# Patient Record
Sex: MALE | Race: ASIAN | NOT HISPANIC OR LATINO | URBAN - METROPOLITAN AREA
[De-identification: names, ages, dates, MRNs, and addresses within clinical notes are randomized per-mention and may not be internally consistent; named-entity substitution may affect disease eponyms.]

---

## 2017-12-25 ENCOUNTER — EMERGENCY (EMERGENCY)
Facility: HOSPITAL | Age: 52
LOS: 1 days | End: 2017-12-25
Attending: EMERGENCY MEDICINE | Admitting: EMERGENCY MEDICINE
Payer: COMMERCIAL

## 2017-12-25 VITALS — SYSTOLIC BLOOD PRESSURE: 152 MMHG | DIASTOLIC BLOOD PRESSURE: 74 MMHG

## 2017-12-25 DIAGNOSIS — I46.9 CARDIAC ARREST, CAUSE UNSPECIFIED: ICD-10-CM

## 2017-12-25 DIAGNOSIS — I10 ESSENTIAL (PRIMARY) HYPERTENSION: ICD-10-CM

## 2017-12-25 LAB
ALBUMIN SERPL ELPH-MCNC: 2.7 G/DL — LOW (ref 3.4–5)
ALBUMIN SERPL ELPH-MCNC: 3.5 G/DL — SIGNIFICANT CHANGE UP (ref 3.4–5)
ALP SERPL-CCNC: 104 U/L — SIGNIFICANT CHANGE UP (ref 40–120)
ALP SERPL-CCNC: 88 U/L — SIGNIFICANT CHANGE UP (ref 40–120)
ALT FLD-CCNC: 137 U/L — HIGH (ref 12–42)
ALT FLD-CCNC: 39 U/L — SIGNIFICANT CHANGE UP (ref 12–42)
ANION GAP SERPL CALC-SCNC: 14 MMOL/L — SIGNIFICANT CHANGE UP (ref 9–16)
ANION GAP SERPL CALC-SCNC: 18 MMOL/L — HIGH (ref 9–16)
APPEARANCE UR: CLEAR — SIGNIFICANT CHANGE UP
APTT BLD: SIGNIFICANT CHANGE UP SEC (ref 27.5–36.5)
AST SERPL-CCNC: 296 U/L — HIGH (ref 15–37)
AST SERPL-CCNC: 52 U/L — HIGH (ref 15–37)
BASOPHILS NFR BLD AUTO: 0.9 % — SIGNIFICANT CHANGE UP (ref 0–2)
BILIRUB SERPL-MCNC: 0.3 MG/DL — SIGNIFICANT CHANGE UP (ref 0.2–1.2)
BILIRUB SERPL-MCNC: 0.4 MG/DL — SIGNIFICANT CHANGE UP (ref 0.2–1.2)
BILIRUB UR-MCNC: NEGATIVE — SIGNIFICANT CHANGE UP
BUN SERPL-MCNC: 23 MG/DL — SIGNIFICANT CHANGE UP (ref 7–23)
BUN SERPL-MCNC: 25 MG/DL — HIGH (ref 7–23)
CALCIUM SERPL-MCNC: 7.5 MG/DL — LOW (ref 8.5–10.5)
CALCIUM SERPL-MCNC: 9 MG/DL — SIGNIFICANT CHANGE UP (ref 8.5–10.5)
CHLORIDE SERPL-SCNC: 101 MMOL/L — SIGNIFICANT CHANGE UP (ref 96–108)
CHLORIDE SERPL-SCNC: 108 MMOL/L — SIGNIFICANT CHANGE UP (ref 96–108)
CK MB BLD-MCNC: 11.04 % — HIGH
CK MB BLD-MCNC: 3.17 % — SIGNIFICANT CHANGE UP
CK MB CFR SERPL CALC: 156.1 NG/ML — HIGH (ref 0.5–3.6)
CK MB CFR SERPL CALC: 4.5 NG/ML — HIGH (ref 0.5–3.6)
CK SERPL-CCNC: 142 U/L — SIGNIFICANT CHANGE UP (ref 39–308)
CO2 SERPL-SCNC: 20 MMOL/L — LOW (ref 22–31)
CO2 SERPL-SCNC: 24 MMOL/L — SIGNIFICANT CHANGE UP (ref 22–31)
COLOR SPEC: YELLOW — SIGNIFICANT CHANGE UP
CREAT SERPL-MCNC: 1.51 MG/DL — HIGH (ref 0.5–1.3)
CREAT SERPL-MCNC: 1.69 MG/DL — HIGH (ref 0.5–1.3)
D DIMER BLD IA.RAPID-MCNC: 1355 NG/ML DDU — HIGH
D DIMER BLD IA.RAPID-MCNC: 2567 NG/ML DDU — HIGH
DIFF PNL FLD: (no result)
EOSINOPHIL NFR BLD AUTO: 1.7 % — SIGNIFICANT CHANGE UP (ref 0–6)
GLUCOSE SERPL-MCNC: 315 MG/DL — HIGH (ref 70–99)
GLUCOSE SERPL-MCNC: 390 MG/DL — HIGH (ref 70–99)
GLUCOSE UR QL: NEGATIVE — SIGNIFICANT CHANGE UP
HCT VFR BLD CALC: 45.9 % — SIGNIFICANT CHANGE UP (ref 39–50)
HCT VFR BLD CALC: 47.1 % — SIGNIFICANT CHANGE UP (ref 39–50)
HGB BLD-MCNC: 14.2 G/DL — SIGNIFICANT CHANGE UP (ref 13–17)
HGB BLD-MCNC: 15.1 G/DL — SIGNIFICANT CHANGE UP (ref 13–17)
IMM GRANULOCYTES NFR BLD AUTO: 9.9 % — HIGH (ref 0–1.5)
INR BLD: 1.53 — HIGH (ref 0.88–1.16)
KETONES UR-MCNC: NEGATIVE — SIGNIFICANT CHANGE UP
LACTATE SERPL-SCNC: 12.2 MMOL/L — CRITICAL HIGH (ref 0.4–2)
LACTATE SERPL-SCNC: 9.9 MMOL/L — CRITICAL HIGH (ref 0.4–2)
LEUKOCYTE ESTERASE UR-ACNC: NEGATIVE — SIGNIFICANT CHANGE UP
LYMPHOCYTES # BLD AUTO: 25.9 % — SIGNIFICANT CHANGE UP (ref 13–44)
MAGNESIUM SERPL-MCNC: 2.6 MG/DL — SIGNIFICANT CHANGE UP (ref 1.6–2.6)
MAGNESIUM SERPL-MCNC: 3.8 MG/DL — HIGH (ref 1.6–2.6)
MCHC RBC-ENTMCNC: 30.6 PG — SIGNIFICANT CHANGE UP (ref 27–34)
MCHC RBC-ENTMCNC: 30.7 PG — SIGNIFICANT CHANGE UP (ref 27–34)
MCHC RBC-ENTMCNC: 30.9 G/DL — LOW (ref 32–36)
MCHC RBC-ENTMCNC: 32.1 G/DL — SIGNIFICANT CHANGE UP (ref 32–36)
MCV RBC AUTO: 95.3 FL — SIGNIFICANT CHANGE UP (ref 80–100)
MCV RBC AUTO: 99.1 FL — SIGNIFICANT CHANGE UP (ref 80–100)
MONOCYTES NFR BLD AUTO: 1.3 % — LOW (ref 2–14)
NEUTROPHILS NFR BLD AUTO: 60.3 % — SIGNIFICANT CHANGE UP (ref 43–77)
NITRITE UR-MCNC: NEGATIVE — SIGNIFICANT CHANGE UP
NT-PROBNP SERPL-SCNC: 235 PG/ML — SIGNIFICANT CHANGE UP
PCO2 BLDV: 113 MMHG — HIGH (ref 41–51)
PCO2 BLDV: 96 MMHG — HIGH (ref 41–51)
PH BLDV: 6.83 — CRITICAL LOW (ref 7.32–7.43)
PH BLDV: 7.03 — CRITICAL LOW (ref 7.32–7.43)
PH UR: 6 — SIGNIFICANT CHANGE UP (ref 5–8)
PLATELET # BLD AUTO: 224 K/UL — SIGNIFICANT CHANGE UP (ref 150–400)
PLATELET # BLD AUTO: 250 K/UL — SIGNIFICANT CHANGE UP (ref 150–400)
PO2 BLDV: 42 MMHG — HIGH (ref 35–40)
PO2 BLDV: 63 MMHG — HIGH (ref 35–40)
POTASSIUM SERPL-MCNC: 3.1 MMOL/L — LOW (ref 3.5–5.3)
POTASSIUM SERPL-MCNC: 3.7 MMOL/L — SIGNIFICANT CHANGE UP (ref 3.5–5.3)
POTASSIUM SERPL-SCNC: 3.1 MMOL/L — LOW (ref 3.5–5.3)
POTASSIUM SERPL-SCNC: 3.7 MMOL/L — SIGNIFICANT CHANGE UP (ref 3.5–5.3)
PROT SERPL-MCNC: 5.7 G/DL — LOW (ref 6.4–8.2)
PROT SERPL-MCNC: 6.7 G/DL — SIGNIFICANT CHANGE UP (ref 6.4–8.2)
PROT UR-MCNC: 30 MG/DL
PROTHROM AB SERPL-ACNC: 17 SEC — HIGH (ref 9.8–12.7)
RBC # BLD: 4.63 M/UL — SIGNIFICANT CHANGE UP (ref 4.2–5.8)
RBC # BLD: 4.94 M/UL — SIGNIFICANT CHANGE UP (ref 4.2–5.8)
RBC # FLD: 11.9 % — SIGNIFICANT CHANGE UP (ref 10.3–16.9)
RBC # FLD: 12.1 % — SIGNIFICANT CHANGE UP (ref 10.3–16.9)
SAO2 % BLDV: 44 % — SIGNIFICANT CHANGE UP
SAO2 % BLDV: 81 % — SIGNIFICANT CHANGE UP
SODIUM SERPL-SCNC: 139 MMOL/L — SIGNIFICANT CHANGE UP (ref 132–145)
SODIUM SERPL-SCNC: 146 MMOL/L — HIGH (ref 132–145)
SP GR SPEC: 1.02 — SIGNIFICANT CHANGE UP (ref 1–1.03)
TROPONIN I SERPL-MCNC: 0.49 NG/ML — HIGH (ref 0.02–0.06)
TROPONIN I SERPL-MCNC: 24.93 NG/ML — CRITICAL HIGH (ref 0.02–0.06)
UROBILINOGEN FLD QL: 0.2 E.U./DL — SIGNIFICANT CHANGE UP
WBC # BLD: 12.7 K/UL — HIGH (ref 3.8–10.5)
WBC # BLD: 5.3 K/UL — SIGNIFICANT CHANGE UP (ref 3.8–10.5)
WBC # FLD AUTO: 12.7 K/UL — HIGH (ref 3.8–10.5)
WBC # FLD AUTO: 5.3 K/UL — SIGNIFICANT CHANGE UP (ref 3.8–10.5)

## 2017-12-25 PROCEDURE — 93010 ELECTROCARDIOGRAM REPORT: CPT | Mod: 77,59

## 2017-12-25 PROCEDURE — 99291 CRITICAL CARE FIRST HOUR: CPT | Mod: 25

## 2017-12-25 PROCEDURE — 93010 ELECTROCARDIOGRAM REPORT: CPT | Mod: 59

## 2017-12-25 PROCEDURE — 99292 CRITICAL CARE ADDL 30 MIN: CPT | Mod: 25

## 2017-12-25 PROCEDURE — 92950 HEART/LUNG RESUSCITATION CPR: CPT

## 2017-12-25 PROCEDURE — 71010: CPT | Mod: 26

## 2017-12-25 PROCEDURE — 99285 EMERGENCY DEPT VISIT HI MDM: CPT

## 2017-12-25 NOTE — ED ADULT NURSE NOTE - OBJECTIVE STATEMENT
Patient is a 53 y/o BIBA s/p cardiac arrest notification. Patient has 7.0 ETT tube, Patient is a 51 y/o BIBA s/p cardiac arrest notification. Patient has 7.0 ETT tube, RAC 20g, and left tibia IO placed by EMS. As per EMS, patient found pulseless and unresponsive, bystander stander CPR immediately. As per EMS, Patient shocked 4 times and given 5 Epi given PTA. CPR in progress, patient on monitor, MD, PA, & RN at bedside. Will continue to monitor. Unable to obtain further HPI due to acuity of illness. Patient is a 53 y/o BIBA s/p cardiac arrest notification. Patient has 7.0 ETT tube, RAC 20g, and left tibia IO placed by EMS. As per EMS, patient found pulseless and unresponsive, an unknown bystander started CPR immediately. As per EMS, Patient shocked 4 times and given 5 Epi given PTA. CPR in progress, , patient on monitor, MD, PA, & RN at bedside. Will continue to monitor. Unable to obtain further HPI due to acuity of illness.

## 2017-12-25 NOTE — ED PROVIDER NOTE - CRITICAL CARE PROVIDED
documentation/direct patient care (not related to procedure)/additional history taking/consult w/ pt's family directly relating to pts condition/interpretation of diagnostic studies/consultation with other physicians

## 2017-12-25 NOTE — ED ADULT NURSE NOTE - ASSIST WITH
Neurosurgery at bedside to assess pt  
Pt updated on status and made ready for MRI  
Two patient identifiers checked and confirmed.    APPEARANCE: Resting comfortably in no acute distress. Patient has clean hair, skin and nails. Clothing is appropriate and properly fastened.  NEURO: Awake, alert, appropriate for age, and cooperative with a calm affect; pupils equal and round. Pt oriented x4.  HEENT: Head symmetrical. Bilateral eyes without redness or drainage.  CARDIAC: Regular rate and rhythm.  RESPIRATORY: Airway is open and patent. Respirations are spontaneous on room air. Normal respiratory effort and rate noted.  GI/: Abdomen soft and non-distended. Patient is reported to void and stool appropriately for age.  NEUROVASCULAR: All extremities are warm and pink with +2 pulses and capillary refill less than 3 seconds.  MUSCULOSKELETAL: Moves all extremities well; no obvious deformities noted.  SKIN: Warm and dry, adequate turgor, mucus membranes moist and pink      
standing/walking/toileting

## 2017-12-25 NOTE — ED PROVIDER NOTE - DIAGNOSTIC INTERPRETATION
Interpreted by ED Physician:  CXR (1 view): no acute abnormality: no infiltrates, bones appear intact, cardiac silhouette looks enlarged but low lung volumes ? mediastinum wide, ETT low (this hd already been pulled back then slipped down again)    Interpreted by ED Physician:  CXR (1 view): Interval increase in pulm edema/hazziness b/l. ETT OK

## 2017-12-25 NOTE — ED PROVIDER NOTE - OBJECTIVE STATEMENT
52 M with hx only HTN s/p witness collapse at the gym. Had bystander CPR then BLS arrived. Shocked x 4 by AED then intubated and given Epi x 5. PEA. Arrived here in PEA. ETT looked deep, pulled back on arrival then it slipped down again and was pulled back again. Mediastinum slight wide on CXR. Patient arrived in our ED ar 12:04- appears to have been down x ~ 20-30 mins prior with CPR thought-out. Coded here (megacode) with sustained ROSC at 13:15. Bedside echo shows poss RV strain and EKG has same pattern. Given recent travel to [facility] patient given TPA at 12:37p and heparin. Wife and son in ED. POC glucose 127. PCO2 ET en rout 83.     ED code: Epi x 17; Vasopressin x 40 (once); Bicarb x 8; Mag 2g x 2; K+ 30 MeQ (K= 3.1); Amio 300, then 150; Lido  x 2; Atropine x 4; NS 3L; DA GGt at 20; Levophed at 10; Heparin and TPA (completed) at 12:37  High quality CPR throughout    Sustained ROSC at 13;15, had brief periods of ROSC throughout but only after TPA, DA ggt (added at 12:51 for touch and go ROSC) and K+ repletion was it sustained. Wife and son debriefed, Accepted for transfer to  ED by Dr. Butcher and Ronit (spoke to Hadley). [facility] at bedside preparing for hypercrit transfer. . Frothy red secretions from ETT and repeat CXR shows b/l pulm edema vs. effects of CPR/TPA. Cards at bedside. Sats holding at 97% after 300cc pink frothy secretions succtions 52 M with hx only HTN s/p witness collapse at the gym. Had bystander CPR then BLS arrived. Shocked x 4 by AED then intubated and given Epi x 5. PEA. Arrived here in PEA. ETT looked deep, pulled back on arrival then it slipped down again and was pulled back again. Mediastinum slight wide on CXR. Patient arrived in our ED ar 12:04- appears to have been down x ~ 20-30 mins prior with CPR thought-out. Coded here (megacode) with sustained ROSC at 13:15. Bedside echo shows poss RV strain and EKG has same pattern. Given recent travel to [facility] patient given TPA at 12:37p and heparin. Wife and son in ED. POC glucose 127. PCO2 ET en rout 83.     ED code: Epi x 17; Vasopressin x 40 (once); Bicarb x 8; Mag 2g x 2; K+ 30 MeQ (K= 3.1); Amio 300, then 150; Lido  x 2; Atropine x 4; NS 3L; DA GGt at 20; Levophed at 10; Heparin and TPA (completed) at 12:37  High quality CPR throughout    [id number] EKGs showed poss STEMI and some of initial ROSC was VT vs. wide CPX bradycardia- tx'd with both antiarrythmic and atropine.  EKGs never evolved to MI pattern.     Sustained ROSC at 13;15, had brief periods of ROSC throughout but only after TPA, DA ggt (added at 12:51 for touch and go ROSC) and K+ repletion was it sustained. Wife and son debriefed, Accepted for transfer to  ED by Dr. Butcher and Rnoit (spoke to Hadley). [facility] at bedside preparing for hypercrit transfer. . Frothy red secretions from ETT and repeat CXR shows b/l pulm edema vs. effects of CPR/TPA. Cards at bedside. Sats holding at 97% after 300cc pink frothy secretions succtions 52 M with hx only HTN s/p witness collapse at the gym. Had bystander CPR then BLS arrived. Shocked x 4 by AED then intubated and given Epi x 5. PEA. Arrived here in PEA. ETT looked deep, pulled back on arrival then it slipped down again and was pulled back again. Mediastinum slight wide on CXR. Patient arrived in our ED ar 12:04- appears to have been down x ~ 20-30 mins prior with CPR thought-out. Coded here (megacode) with sustained ROSC at 13:15. Bedside echo shows poss RV strain and EKG has same pattern. Given recent travel to [facility] patient given TPA at 12:37p and heparin. Wife and son in ED. POC glucose 127. PCO2 ET en rout 83.     ED code: Epi x 17; Vasopressin x 40 (once); Bicarb x 8; Mag 2g x 2; K+ 30 MeQ (K= 3.1); Amio 300, then 150; Lido  x 2; CaGluc 1g; Atropine x 4; NS 3L; DA GGt at 20; Levophed at 10; Heparin and TPA (completed) at 12:37  High quality CPR throughout    [id number] EKGs showed poss STEMI and some of initial ROSC was transient bursts of VT (too short to shock) vs. wide CPX bradycardia- tx'd with both antiarrythmic and atropine.  EKGs never evolved to MI pattern. Lido added given chance of MI (better in MIs).     Sustained ROSC at 13;15, had brief periods of ROSC throughout but only after TPA, DA ggt (added at 12:51 for touch and go ROSC) and K+ repletion was it sustained. Wife and son debriefed, Accepted for transfer to  ED by Dr. Butcher and Ronit (spoke to Hadley). [facility] at bedside preparing for hypercrit transfer. . Frothy red secretions from ETT and repeat CXR shows b/l pulm edema vs. effects of CPR/TPA. Cards at bedside. Sats holding at 97% after 300cc pink frothy secretions succtions

## 2017-12-25 NOTE — PROVIDER CONTACT NOTE (CRITICAL VALUE NOTIFICATION) - DATE AND TIME:
25-Dec-2017 14:45 Dr. Landry notified of patient increasing BPs and HR. 4mg Versed given, prn Versed also ordered. Will continue to monitor.    25-Dec-2017 14:46

## 2017-12-25 NOTE — ED PROVIDER NOTE - PROGRESS NOTE DETAILS
UPDATE ADDENDUM from Dr. Butcher at : DDimer 1300-. 2500. Trop pooja to 24 here then 31 there. Poss R inf wall motion defect. CT head and C-spine neg, CT Chest b/l pulm contusions from CPR. Unclear if there is a PE. Possible cath vs. waiting as TPA may have opened lesions. Maintaining BP on Levophed, off DA, breathing on his own on vent, pupils reactive mid size.

## 2017-12-25 NOTE — ED PROVIDER NOTE - MEDICAL DECISION MAKING DETAILS
Patient presenting with withnessed cardiac arrest with ROSC after prolonged aggressive resus and correction of hypoK, addition of pressors and TPA/heparin for presumed PE. Accepted for transfer to .. So far sustained ROSC on Levophed and DA x 1h. Patient presenting with withnessed cardiac arrest with ROSC after prolonged aggressive resus and correction of hypoK, addition of pressors and TPA/heparin for presumed PE. Accepted for transfer to . Repeat set of labs just sent. So far sustained ROSC on Levophed and DA x 1h.

## 2017-12-25 NOTE — ED PROVIDER NOTE - RESPIRATORY, MLM
Breath sounds clear and equal bilaterally after ETT adjusted (needed it twice as tube slipped back in after CPR x 30 mins

## 2017-12-25 NOTE — ED ADULT TRIAGE NOTE - CHIEF COMPLAINT QUOTE
pt found unresponsive and pulseless at the gym. BLS arrived and cpr initiated with 4 shocks delivered, epi given  x 5, and 0.5 atropine. pt down approximately 30 minutes. intubated with 7.0 ETT.

## 2017-12-26 PROCEDURE — 71010: CPT | Mod: 26
